# Patient Record
Sex: MALE | Race: BLACK OR AFRICAN AMERICAN | Employment: UNEMPLOYED | URBAN - METROPOLITAN AREA
[De-identification: names, ages, dates, MRNs, and addresses within clinical notes are randomized per-mention and may not be internally consistent; named-entity substitution may affect disease eponyms.]

---
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
## 2021-05-30 ENCOUNTER — HOSPITAL ENCOUNTER (INPATIENT)
Age: 21
LOS: 2 days | Discharge: HOME | DRG: 881 | End: 2021-06-02
Attending: EMERGENCY MEDICINE | Admitting: PSYCHIATRY & NEUROLOGY
Payer: COMMERCIAL

## 2021-05-30 ENCOUNTER — APPOINTMENT (OUTPATIENT)
Dept: GENERAL RADIOLOGY | Age: 21
DRG: 881 | End: 2021-05-30
Attending: EMERGENCY MEDICINE
Payer: COMMERCIAL

## 2021-05-30 DIAGNOSIS — R45.851 SUICIDAL IDEATION: ICD-10-CM

## 2021-05-30 DIAGNOSIS — T50.902A INTENTIONAL DRUG OVERDOSE, INITIAL ENCOUNTER (HCC): Primary | ICD-10-CM

## 2021-05-30 DIAGNOSIS — S61.519A LACERATION OF WRIST, UNSPECIFIED LATERALITY, INITIAL ENCOUNTER: ICD-10-CM

## 2021-05-30 LAB
ALBUMIN SERPL-MCNC: 4.2 G/DL (ref 3.4–5)
ALBUMIN/GLOB SERPL: 1.1 {RATIO} (ref 0.8–1.7)
ALP SERPL-CCNC: 73 U/L (ref 45–117)
ALT SERPL-CCNC: 23 U/L (ref 16–61)
ANION GAP SERPL CALC-SCNC: 5 MMOL/L (ref 3–18)
APAP SERPL-MCNC: <2 UG/ML (ref 10–30)
AST SERPL-CCNC: 25 U/L (ref 10–38)
BASOPHILS # BLD: 0 K/UL (ref 0–0.1)
BASOPHILS NFR BLD: 1 % (ref 0–2)
BILIRUB SERPL-MCNC: 0.8 MG/DL (ref 0.2–1)
BUN SERPL-MCNC: 8 MG/DL (ref 7–18)
BUN/CREAT SERPL: 9 (ref 12–20)
CALCIUM SERPL-MCNC: 9.1 MG/DL (ref 8.5–10.1)
CHLORIDE SERPL-SCNC: 108 MMOL/L (ref 100–111)
CO2 SERPL-SCNC: 25 MMOL/L (ref 21–32)
CREAT SERPL-MCNC: 0.92 MG/DL (ref 0.6–1.3)
DIFFERENTIAL METHOD BLD: ABNORMAL
EOSINOPHIL # BLD: 0.1 K/UL (ref 0–0.4)
EOSINOPHIL NFR BLD: 1 % (ref 0–5)
ERYTHROCYTE [DISTWIDTH] IN BLOOD BY AUTOMATED COUNT: 13.2 % (ref 11.6–14.5)
ETHANOL SERPL-MCNC: <3 MG/DL (ref 0–3)
GLOBULIN SER CALC-MCNC: 3.7 G/DL (ref 2–4)
GLUCOSE SERPL-MCNC: 150 MG/DL (ref 74–99)
HCT VFR BLD AUTO: 43.6 % (ref 36–48)
HGB BLD-MCNC: 14.7 G/DL (ref 13–16)
LIPASE SERPL-CCNC: 37 U/L (ref 73–393)
LYMPHOCYTES # BLD: 0.8 K/UL (ref 0.9–3.6)
LYMPHOCYTES NFR BLD: 17 % (ref 21–52)
MAGNESIUM SERPL-MCNC: 2.4 MG/DL (ref 1.6–2.6)
MCH RBC QN AUTO: 26.5 PG (ref 24–34)
MCHC RBC AUTO-ENTMCNC: 33.7 G/DL (ref 31–37)
MCV RBC AUTO: 78.6 FL (ref 74–97)
MONOCYTES # BLD: 0.5 K/UL (ref 0.05–1.2)
MONOCYTES NFR BLD: 11 % (ref 3–10)
NEUTS SEG # BLD: 3.3 K/UL (ref 1.8–8)
NEUTS SEG NFR BLD: 71 % (ref 40–73)
PLATELET # BLD AUTO: 312 K/UL (ref 135–420)
PMV BLD AUTO: 10 FL (ref 9.2–11.8)
POTASSIUM SERPL-SCNC: 3.9 MMOL/L (ref 3.5–5.5)
PROT SERPL-MCNC: 7.9 G/DL (ref 6.4–8.2)
RBC # BLD AUTO: 5.55 M/UL (ref 4.35–5.65)
SALICYLATES SERPL-MCNC: <1.7 MG/DL (ref 2.8–20)
SODIUM SERPL-SCNC: 138 MMOL/L (ref 136–145)
WBC # BLD AUTO: 4.7 K/UL (ref 4.6–13.2)

## 2021-05-30 PROCEDURE — 83690 ASSAY OF LIPASE: CPT

## 2021-05-30 PROCEDURE — 71045 X-RAY EXAM CHEST 1 VIEW: CPT

## 2021-05-30 PROCEDURE — 80179 DRUG ASSAY SALICYLATE: CPT

## 2021-05-30 PROCEDURE — 83735 ASSAY OF MAGNESIUM: CPT

## 2021-05-30 PROCEDURE — 85025 COMPLETE CBC W/AUTO DIFF WBC: CPT

## 2021-05-30 PROCEDURE — 81003 URINALYSIS AUTO W/O SCOPE: CPT

## 2021-05-30 PROCEDURE — 99285 EMERGENCY DEPT VISIT HI MDM: CPT

## 2021-05-30 PROCEDURE — 80053 COMPREHEN METABOLIC PANEL: CPT

## 2021-05-30 PROCEDURE — 82077 ASSAY SPEC XCP UR&BREATH IA: CPT

## 2021-05-30 PROCEDURE — 80307 DRUG TEST PRSMV CHEM ANLYZR: CPT

## 2021-05-30 PROCEDURE — 81001 URINALYSIS AUTO W/SCOPE: CPT

## 2021-05-30 PROCEDURE — 93005 ELECTROCARDIOGRAM TRACING: CPT

## 2021-05-30 PROCEDURE — 80143 DRUG ASSAY ACETAMINOPHEN: CPT

## 2021-05-31 PROBLEM — F32.A DEPRESSIVE DISORDER: Status: ACTIVE | Noted: 2021-05-31

## 2021-05-31 LAB
AMPHET UR QL SCN: NEGATIVE
APPEARANCE UR: ABNORMAL
ATRIAL RATE: 101 BPM
BACTERIA URNS QL MICRO: ABNORMAL /HPF
BARBITURATES UR QL SCN: NEGATIVE
BENZODIAZ UR QL: NEGATIVE
BILIRUB UR QL: ABNORMAL
CALCULATED P AXIS, ECG09: 77 DEGREES
CALCULATED R AXIS, ECG10: 56 DEGREES
CALCULATED T AXIS, ECG11: 49 DEGREES
CANNABINOIDS UR QL SCN: POSITIVE
COCAINE UR QL SCN: NEGATIVE
COLOR UR: ABNORMAL
COVID-19 RAPID TEST, COVR: NOT DETECTED
DIAGNOSIS, 93000: NORMAL
EPITH CASTS URNS QL MICRO: ABNORMAL /LPF (ref 0–5)
GLUCOSE UR STRIP.AUTO-MCNC: NEGATIVE MG/DL
HDSCOM,HDSCOM: ABNORMAL
HGB UR QL STRIP: NEGATIVE
KETONES UR QL STRIP.AUTO: >160 MG/DL
LEUKOCYTE ESTERASE UR QL STRIP.AUTO: ABNORMAL
METHADONE UR QL: NEGATIVE
NITRITE UR QL STRIP.AUTO: NEGATIVE
OPIATES UR QL: NEGATIVE
P-R INTERVAL, ECG05: 138 MS
PCP UR QL: NEGATIVE
PH UR STRIP: 6 [PH] (ref 5–8)
PROT UR STRIP-MCNC: 100 MG/DL
Q-T INTERVAL, ECG07: 308 MS
QRS DURATION, ECG06: 86 MS
QTC CALCULATION (BEZET), ECG08: 399 MS
RBC #/AREA URNS HPF: NEGATIVE /HPF (ref 0–5)
SOURCE, COVRS: NORMAL
SP GR UR REFRACTOMETRY: >1.03 (ref 1–1.03)
UROBILINOGEN UR QL STRIP.AUTO: 1 EU/DL (ref 0.2–1)
VENTRICULAR RATE, ECG03: 101 BPM
WBC URNS QL MICRO: ABNORMAL /HPF (ref 0–4)

## 2021-05-31 PROCEDURE — 87635 SARS-COV-2 COVID-19 AMP PRB: CPT

## 2021-05-31 PROCEDURE — 90471 IMMUNIZATION ADMIN: CPT

## 2021-05-31 PROCEDURE — 99221 1ST HOSP IP/OBS SF/LOW 40: CPT | Performed by: PSYCHIATRY & NEUROLOGY

## 2021-05-31 PROCEDURE — 90715 TDAP VACCINE 7 YRS/> IM: CPT | Performed by: EMERGENCY MEDICINE

## 2021-05-31 PROCEDURE — 96361 HYDRATE IV INFUSION ADD-ON: CPT

## 2021-05-31 PROCEDURE — 96360 HYDRATION IV INFUSION INIT: CPT

## 2021-05-31 PROCEDURE — 74011250636 HC RX REV CODE- 250/636: Performed by: EMERGENCY MEDICINE

## 2021-05-31 PROCEDURE — 65220000003 HC RM SEMIPRIVATE PSYCH

## 2021-05-31 PROCEDURE — 74011250637 HC RX REV CODE- 250/637: Performed by: PSYCHIATRY & NEUROLOGY

## 2021-05-31 RX ORDER — BUPROPION HYDROCHLORIDE 100 MG/1
100 TABLET ORAL 2 TIMES DAILY
Status: DISCONTINUED | OUTPATIENT
Start: 2021-05-31 | End: 2021-06-02 | Stop reason: HOSPADM

## 2021-05-31 RX ORDER — HYDROXYZINE PAMOATE 50 MG/1
50 CAPSULE ORAL
Status: DISCONTINUED | OUTPATIENT
Start: 2021-05-31 | End: 2021-06-02 | Stop reason: HOSPADM

## 2021-05-31 RX ORDER — TRAZODONE HYDROCHLORIDE 50 MG/1
50 TABLET ORAL
Status: DISCONTINUED | OUTPATIENT
Start: 2021-05-31 | End: 2021-06-02 | Stop reason: HOSPADM

## 2021-05-31 RX ADMIN — BUPROPION HYDROCHLORIDE 100 MG: 100 TABLET, FILM COATED ORAL at 20:45

## 2021-05-31 RX ADMIN — TRAZODONE HYDROCHLORIDE 50 MG: 50 TABLET ORAL at 20:44

## 2021-05-31 RX ADMIN — SODIUM CHLORIDE 1000 ML: 900 INJECTION, SOLUTION INTRAVENOUS at 01:39

## 2021-05-31 RX ADMIN — SODIUM CHLORIDE 1000 ML: 900 INJECTION, SOLUTION INTRAVENOUS at 01:38

## 2021-05-31 RX ADMIN — TETANUS TOXOID, REDUCED DIPHTHERIA TOXOID AND ACELLULAR PERTUSSIS VACCINE, ADSORBED 0.5 ML: 5; 2.5; 8; 8; 2.5 SUSPENSION INTRAMUSCULAR at 03:59

## 2021-05-31 NOTE — H&P
[address] Services    Admission Note      Patient:  Barb Trent Age:  23 y.o. :  2000     SEX:  male MRN:  [id number] [facility]:  [id number]    2021  5:16 PM    Informants and Patient Contacts: Patient/Medical Records      Identifying Data and Chief Compliant: 23year old AA male, with h/o depression,  who was admitted involuntarily for suicidal attempt. He took an overdose of Zyrtec and alcohol. States he passed out and was found by his grandmother. He also caused superficial lacerations on his arms. He states he had a an argument with his girl friend about his new job. He denies any SI today. Denies any access to guns. He states he will work in [employer] in [place] and does not have his driving licence. He feels that he is a burden on his family. He states he lost two of his friend this year and lost two of his pet dogs last year. He lives with his grandparents. His parents are . He has one full brother and several half siblings. He got his GED in 2017. States he is trying to have a child with his girlfriend. History of Present Illness:     Denies previous h/o psych hospitalizations or self harm. He reports  Previous psych follow up at [place] [id number] when his parents were getting . He was on Prozac and Concerta. Past Psychiatric/Medical History:   Past Medical History:   Diagnosis Date    Left hand fracture 12       Substance Use History: Alcohol/Substance abuse /smoking history reviewed. States he smokes marijuana once or twice a week. Denies alcohol use. Allergies: Allergies   Allergen Reactions    Pcn [Penicillins] Rash       Prior to admission medications:   Medications Prior to Admission   Medication Sig    lidocaine (XYLOCAINE) 2 % solution Take 15 mL by mouth as needed for Pain.  (Patient not taking: Reported on 2021)       Current medications:   Current Facility-Administered Medications   Medication Sclerosant Volume (Cc): 6 Dose Route Frequency    hydrOXYzine pamoate (VISTARIL) capsule 50 mg  50 mg Oral Q4H PRN    traZODone (DESYREL) tablet 50 mg  50 mg Oral QHS PRN       Outpatient Physicians:    No stable outpatient psych follow up. Review of Systems  Mood changes    Family History of psychiatric and medical illness and substance abuse  Family History   Problem Relation Age of Onset    Hypertension Mother     Diabetes Father     Diabetes Maternal Grandmother     Hypertension Maternal Grandmother     Elevated Lipids Maternal Grandmother     Diabetes Other     Hypertension Other          Personal History:    Early childhood, developmental, social, occupational and marital history reviewed. He states he will work in [employer] in [place] and does not have his driving licence. He feels that he is a burden on his family. He states he lost two of his friend this year and lost two of his pet dogs last year. He lives with his grandparents. His parents are . He has one full brother and several half siblings. He got his GED in 2017. States he is trying to have a child with his girlfriend.          Mental Status Exam      Appearance    General Behavior   Pleasant and cooperative     Speech form and content,  Language  Associations  Form of Thought   Normal flow and volume  TP : Logical, goal oriented   Mood, Affect  Self-Attitude  Vital Sense  SI/HI/PDW   Depressed  No SI, HI,  Endorses hopelessness   Abnormal Perceptions and illusions   Denies     Delusions   None   Anxiety    Denies   COGNITION Intelligence Abstraction   Intact   Judgement Insight   Limited       Data/Labs/Vital Signs    Patient Vitals for the past 24 hrs:   BP Temp Pulse Resp SpO2 Weight   05/31/21 1100 129/83 97.7 °F (36.5 °C) 65 17     05/31/21 1030 119/86  80 17 97 %    05/31/21 1015 134/67  76 17 98 %    05/31/21 1000 127/76  74 16 98 %    05/31/21 0945 127/82  79 11 100 %    05/31/21 0930 (!) 134/96  89 11 100 %    05/31/21 0915 125/83  79 19 100 %    05/31/21 0900 121/70  69 14 98 %    05/31/21 0845 139/67  67 16 99 %    05/31/21 0830 (!) 151/82  76 16 100 %    05/31/21 0815 130/89 98 °F (36.7 °C) 84 17 100 %    05/31/21 0800 115/80  62 17 100 %    05/31/21 0745 119/73  86 18 100 %    05/31/21 0730 105/64  63 17 100 %    05/31/21 0715 112/64  65 17 100 %    05/31/21 0700 106/70  (!) 56 14 100 %    05/31/21 0600 120/73  (!) 57 15 100 %    05/31/21 0545 117/86  79 17 100 %    05/31/21 0530 116/77  67 16 100 %    05/31/21 0515 120/67  62 16 100 %    05/31/21 0500 115/69  65 15 100 %    05/31/21 0445 115/76  63 16 100 %    05/31/21 0430 122/69  67 17 99 %    05/31/21 0415 118/74  70 17 99 %    05/31/21 0400 126/86  76 13 100 %    05/31/21 0345 123/86  65 15 100 %    05/31/21 0330 122/69  66 18 100 %    05/31/21 0315 122/63  69 16 100 %    05/31/21 0300 116/73  70 15 100 %    05/31/21 0245 121/71  75 15 100 %    05/31/21 0230 119/77  79 18 100 %    05/31/21 0215 130/76  72 18 99 %    05/31/21 0200 131/79  79 17 99 %    05/31/21 0149     100 %    05/31/21 0145 139/76  82 17 99 %    05/31/21 0130 (!) 140/88  92 20 100 %    05/31/21 0115 138/86  87 17 98 %    05/31/21 0100 136/87  95 19 99 %    05/31/21 0045 135/86  88 19 99 %    05/31/21 0030 (!) 146/97  (!) 103 17 100 %    05/31/21 0015 135/86  86 18 99 %    05/31/21 0000 (!) 131/90  82 15 99 %    05/30/21 2345 (!) 142/90  84 18 100 %    05/30/21 2330 (!) 143/81  94 21 100 %    05/30/21 2315 139/78  (!) 101 17 100 %    05/30/21 2314   100 20 100 %    05/30/21 2313   (!) 103 20 100 %    05/30/21 2312   97 19 100 %    05/30/21 2311   (!) 112 23 100 %    05/30/21 2310 (!) 149/93 98.9 °F (37.2 °C) (!) 111 22 100 % 72.6 kg (160 lb)   05/30/21 2309   99 20 100 %    05/30/21 2308   (!) 112 23 100 %    05/30/21 2307   95 18 100 %    05/30/21 2306   (!) 106 19 100 %        Recent Results (from the past 24 hour(s)) Sclerosant Volume (Cc): 0 CBC WITH AUTOMATED DIFF    Collection Time: 05/30/21 11:05 PM   Result Value Ref Range    WBC 4.7 4.6 - 13.2 K/uL    RBC 5.55 4.35 - 5.65 M/uL    HGB 14.7 13.0 - 16.0 g/dL    HCT 43.6 36.0 - 48.0 %    MCV 78.6 74.0 - 97.0 FL    MCH 26.5 24.0 - 34.0 PG    MCHC 33.7 31.0 - 37.0 g/dL    RDW 13.2 11.6 - 14.5 %    PLATELET 746 368 - 997 K/uL    MPV 10.0 9.2 - 11.8 FL    NEUTROPHILS 71 40 - 73 %    LYMPHOCYTES 17 (L) 21 - 52 %    MONOCYTES 11 (H) 3 - 10 %    EOSINOPHILS 1 0 - 5 %    BASOPHILS 1 0 - 2 %    ABS. NEUTROPHILS 3.3 1.8 - 8.0 K/UL    ABS. LYMPHOCYTES 0.8 (L) 0.9 - 3.6 K/UL    ABS. MONOCYTES 0.5 0.05 - 1.2 K/UL    ABS. EOSINOPHILS 0.1 0.0 - 0.4 K/UL    ABS. BASOPHILS 0.0 0.0 - 0.1 K/UL    DF AUTOMATED     METABOLIC PANEL, COMPREHENSIVE    Collection Time: 05/30/21 11:05 PM   Result Value Ref Range    Sodium 138 136 - 145 mmol/L    Potassium 3.9 3.5 - 5.5 mmol/L    Chloride 108 100 - 111 mmol/L    CO2 25 21 - 32 mmol/L    Anion gap 5 3.0 - 18 mmol/L    Glucose 150 (H) 74 - 99 mg/dL    BUN 8 7.0 - 18 MG/DL    Creatinine 0.92 0.6 - 1.3 MG/DL    BUN/Creatinine ratio 9 (L) 12 - 20      GFR est AA >60 >60 ml/min/1.73m2    GFR est non-AA >60 >60 ml/min/1.73m2    Calcium 9.1 8.5 - 10.1 MG/DL    Bilirubin, total 0.8 0.2 - 1.0 MG/DL    ALT (SGPT) 23 16 - 61 U/L    AST (SGOT) 25 10 - 38 U/L    Alk.  phosphatase 73 45 - 117 U/L    Protein, total 7.9 6.4 - 8.2 g/dL    Albumin 4.2 3.4 - 5.0 g/dL    Globulin 3.7 2.0 - 4.0 g/dL    A-G Ratio 1.1 0.8 - 1.7     ETHYL ALCOHOL    Collection Time: 05/30/21 11:05 PM   Result Value Ref Range    ALCOHOL(ETHYL),SERUM <3 0 - 3 MG/DL   LIPASE    Collection Time: 05/30/21 11:05 PM   Result Value Ref Range    Lipase 37 (L) 73 - 393 U/L   MAGNESIUM    Collection Time: 05/30/21 11:05 PM   Result Value Ref Range    Magnesium 2.4 1.6 - 2.6 mg/dL   SALICYLATE    Collection Time: 05/30/21 11:05 PM   Result Value Ref Range    Salicylate level <0.9 (L) 2.8 - 20.0 MG/DL   ACETAMINOPHEN    Collection Detail Level: Zone Sclerosant Volume (Cc): 10 Time: 05/30/21 11:05 PM   Result Value Ref Range    Acetaminophen level <2 (L) 10.0 - 30.0 ug/mL   EKG, 12 LEAD, INITIAL    Collection Time: 05/30/21 11:15 PM   Result Value Ref Range    Ventricular Rate 101 BPM    Atrial Rate 101 BPM    P-R Interval 138 ms    QRS Duration 86 ms    Q-T Interval 308 ms    QTC Calculation (Bezet) 399 ms    Calculated P Axis 77 degrees    Calculated R Axis 56 degrees    Calculated T Axis 49 degrees    Diagnosis       Sinus tachycardia  Possible Left atrial enlargement  Borderline ECG  No previous ECGs available  Confirmed by Brian Martins MD, ----- ([id number]) on 5/31/2021 9:26:38 AM     URINALYSIS W/ RFLX MICROSCOPIC    Collection Time: 05/30/21 11:35 PM   Result Value Ref Range    Color DARK YELLOW      Appearance CLOUDY      Specific gravity >1.030 (H) 1.005 - 1.030    pH (UA) 6.0 5.0 - 8.0      Protein 100 (A) NEG mg/dL    Glucose Negative NEG mg/dL    Ketone >160 (A) NEG mg/dL    Bilirubin MODERATE (A) NEG      Blood Negative NEG      Urobilinogen 1.0 0.2 - 1.0 EU/dL    Nitrites Negative NEG      Leukocyte Esterase SMALL (A) NEG     DRUG SCREEN, URINE    Collection Time: 05/30/21 11:35 PM   Result Value Ref Range    BENZODIAZEPINES Negative NEG      BARBITURATES Negative NEG      THC (TH-CANNABINOL) Positive (A) NEG      OPIATES Negative NEG      PCP(PHENCYCLIDINE) Negative NEG      COCAINE Negative NEG      AMPHETAMINES Negative NEG      METHADONE Negative NEG      HDSCOM (NOTE)    URINE MICROSCOPIC ONLY    Collection Time: 05/30/21 11:35 PM   Result Value Ref Range    WBC 0 to 5 0 - 4 /hpf    RBC Negative 0 - 5 /hpf    Epithelial cells 1+ 0 - 5 /lpf    Bacteria 1+ (A) NEG /hpf   COVID-19 RAPID TEST    Collection Time: 05/31/21  4:00 AM   Result Value Ref Range    Specimen source Nasopharyngeal      COVID-19 rapid test Not detected NOTD         Axis I: Major depressive disorder   Axis II: Deferred  Axis III:   Past Medical History:   Diagnosis Date    Left hand fracture 1/6/13 Axis IV: Poor social supports  Axis V: 30      Recommendations/Plan  · Admit to the inpatient unit for stabilization  · Consider starting SSRI   · Engage in group and milieu activities  · Continue supportive therapy  · Continue to evaluate safety concerns            I certify that this patient's inpatient psychiatric hospital services furnished since the previous certification were, and continue to be, required for treatment that could reasonably be expected to improve the patient's condition, or for diagnostic study, and that the patient continues to need, on a daily basis, active treatment furnished directly by or requiring the supervision of inpatient psychiatric facility personnel. In addition the hospital records show that services furnished were intensive treatment services, admission or related services, or equivalent services.     Rodrigo Serrano MD 5/31/2021 5:16 PM Sclerosant (A) %: 0.30 Post-Care Instructions: Wear prescription compression stockings for up to 1 week.  No exercising for 1 week.

## 2021-05-31 NOTE — BH NOTES
Patient arrived onto unit via wheelchair dressed in paper scrubs and non-skid footwear and accompanied by ED staff and security. Patient cooperative with admission process and has signed paperwork. Patient educated regarding TDO process and \"what to expect. \"  Patient oriented to unit by this nurse, given toiletry bag and shown to room. Patient did not have belongings upon arrival stating \"my brother took them home. \"  Patient encouraged to call family member to bring appropriate clothing and/or snacks. Will continue to monitor and provide interventions as appropriate. Patient admitted under TDO status and admits to this nurse he purposely took \"handful\" of Zyrtec with alcohol in suicide attempt. Patient also admits to self-inflicted (very superficial) lacerations to bilateral wrists. Patient did not require sutures. Patient states stressors include lack of financial resources and arguing with girlfriend \"more than usual.\"  Patient states he was supposed to begin new job this week at [employer] in [place] \"but my girlfriend would have been the one to take me to work so I don't really know what's going to happen now. \"      RN's will initiate, develop, implement, review or revise treatment plan as appropriate for patient.

## 2021-05-31 NOTE — ED PROVIDER NOTES
The patient is a 42-year-old male with no significant past medical history or psychiatric history, who was brought to the ED today with blunt force meant under an ECO after he took multiple tablets of Zyrtec this evening. He grabbed a bottle of his grandmothers Zyrtec that had 90 tablets at 1 point. They are 10 mg tablets. I counted the pills and there are 20 left. He is unsure of how many he took. The prescription was issued over a year ago. The patient states that he just grabbed the bottle and started taking as many as he could. He states that he was drinking alcohol at the same time. After that, he began cutting his wrists. He states that his grandmother found him. He was sleeping at that time. He felt like he was drifting off and potentially dying at that time. He states that he had been drinking alcohol this evening but denies any drug use. He states that he has been financially unsettled recently. He has been going from job to job. He has another job coming up but he is trying to help everyone that he can but feels like he is not helping to \"provide for everyone\" as much as he can. He also states that he and his girlfriend are \"working through some things. \"    He has had no prior suicide attempts or psychiatric hospitalizations. He states that he was just in the moment when he took the medications and cut his wrists.            Past Medical History:   Diagnosis Date    Left hand fracture 1/6/13       Past Surgical History:   Procedure Laterality Date    HX CIRCUMCISION      2014         Family History:   Problem Relation Age of Onset    Hypertension Mother     Diabetes Father     Diabetes Maternal Grandmother     Hypertension Maternal Grandmother     Elevated Lipids Maternal Grandmother     Diabetes Other     Hypertension Other        Social History     Socioeconomic History    Marital status: UNKNOWN     Spouse name: Not on file    Number of children: Not on file    Years of education: Not on file    Highest education level: Not on file   Occupational History    Not on file   Tobacco Use    Smoking status: Former Smoker    Smokeless tobacco: Never Used   Substance and Sexual Activity    Alcohol use: No    Drug use: Yes     Types: Marijuana    Sexual activity: Yes     Partners: Female     Birth control/protection: Condom   Other Topics Concern    Not on file   Social History Narrative    ** Merged History Encounter **          Social Determinants of Health     Financial Resource Strain:     Difficulty of Paying Living Expenses:    Food Insecurity:     Worried About Running Out of Food in the Last Year:     Ran Out of Food in the Last Year:    Transportation Needs:     Lack of Transportation (Medical):  Lack of Transportation (Non-Medical):    Physical Activity:     Days of Exercise per Week:     Minutes of Exercise per Session:    Stress:     Feeling of Stress :    Social Connections:     Frequency of Communication with Friends and Family:     Frequency of Social Gatherings with Friends and Family:     Attends Temple Services:     Active Member of Clubs or Organizations:     Attends Club or Organization Meetings:     Marital Status:    Intimate Partner Violence:     Fear of Current or Ex-Partner:     Emotionally Abused:     Physically Abused:     Sexually Abused: ALLERGIES: Pcn [penicillins]    Review of Systems   All other systems reviewed and are negative. Vitals:    05/30/21 2314 05/30/21 2315 05/30/21 2330 05/30/21 2345   BP:  139/78 (!) 143/81 (!) 142/90   Pulse: 100 (!) 101 94 84   Resp: 20 17 21 18   Temp:       SpO2: 100% 100% 100% 100%   Weight:                Physical Exam  Vitals and nursing note reviewed. Constitutional:       Appearance: He is normal weight. Comments: Somnolent but arousable   HENT:      Head: Normocephalic and atraumatic.       Right Ear: External ear normal.      Left Ear: External ear normal.      Nose: Nose normal.      Mouth/Throat:      Mouth: Mucous membranes are dry. Pharynx: Oropharynx is clear. Comments: Dried white powder on the patient's lips  Eyes:      Extraocular Movements: Extraocular movements intact. Conjunctiva/sclera: Conjunctivae normal.      Pupils: Pupils are equal, round, and reactive to light. Cardiovascular:      Rate and Rhythm: Normal rate and regular rhythm. Pulses: Normal pulses. Heart sounds: Normal heart sounds. Pulmonary:      Effort: Pulmonary effort is normal.      Breath sounds: Normal breath sounds. Abdominal:      General: Abdomen is flat. Bowel sounds are normal.      Palpations: Abdomen is soft. Musculoskeletal:         General: Normal range of motion. Cervical back: Normal range of motion and neck supple. Skin:     General: Skin is warm and dry. Capillary Refill: Capillary refill takes less than 2 seconds. Comments: Multiple superficial abrasions and lacerations forearms. None are deep enough to require sutures. Neurological:      General: No focal deficit present. Mental Status: He is alert and oriented to person, place, and time. Psychiatric:         Attention and Perception: Attention and perception normal.         Mood and Affect: Mood is depressed. Affect is flat. Speech: Speech normal.         Behavior: Behavior is slowed. Behavior is cooperative. Thought Content: Thought content includes suicidal ideation. Thought content does not include homicidal ideation. Thought content includes suicidal plan. Thought content does not include homicidal plan. Cognition and Memory: Cognition and memory normal.         Judgment: Judgment is impulsive and inappropriate.           Recent Results (from the past 12 hour(s))   CBC WITH AUTOMATED DIFF    Collection Time: 05/30/21 11:05 PM   Result Value Ref Range    WBC 4.7 4.6 - 13.2 K/uL    RBC 5.55 4.35 - 5.65 M/uL    HGB 14.7 13.0 - 16.0 g/dL    HCT 43.6 36.0 - 48.0 %    MCV 78.6 74.0 - 97.0 FL    MCH 26.5 24.0 - 34.0 PG    MCHC 33.7 31.0 - 37.0 g/dL    RDW 13.2 11.6 - 14.5 %    PLATELET 110 313 - 936 K/uL    MPV 10.0 9.2 - 11.8 FL    NEUTROPHILS 71 40 - 73 %    LYMPHOCYTES 17 (L) 21 - 52 %    MONOCYTES 11 (H) 3 - 10 %    EOSINOPHILS 1 0 - 5 %    BASOPHILS 1 0 - 2 %    ABS. NEUTROPHILS 3.3 1.8 - 8.0 K/UL    ABS. LYMPHOCYTES 0.8 (L) 0.9 - 3.6 K/UL    ABS. MONOCYTES 0.5 0.05 - 1.2 K/UL    ABS. EOSINOPHILS 0.1 0.0 - 0.4 K/UL    ABS. BASOPHILS 0.0 0.0 - 0.1 K/UL    DF AUTOMATED     METABOLIC PANEL, COMPREHENSIVE    Collection Time: 05/30/21 11:05 PM   Result Value Ref Range    Sodium 138 136 - 145 mmol/L    Potassium 3.9 3.5 - 5.5 mmol/L    Chloride 108 100 - 111 mmol/L    CO2 25 21 - 32 mmol/L    Anion gap 5 3.0 - 18 mmol/L    Glucose 150 (H) 74 - 99 mg/dL    BUN 8 7.0 - 18 MG/DL    Creatinine 0.92 0.6 - 1.3 MG/DL    BUN/Creatinine ratio 9 (L) 12 - 20      GFR est AA >60 >60 ml/min/1.73m2    GFR est non-AA >60 >60 ml/min/1.73m2    Calcium 9.1 8.5 - 10.1 MG/DL    Bilirubin, total 0.8 0.2 - 1.0 MG/DL    ALT (SGPT) 23 16 - 61 U/L    AST (SGOT) 25 10 - 38 U/L    Alk.  phosphatase 73 45 - 117 U/L    Protein, total 7.9 6.4 - 8.2 g/dL    Albumin 4.2 3.4 - 5.0 g/dL    Globulin 3.7 2.0 - 4.0 g/dL    A-G Ratio 1.1 0.8 - 1.7     ETHYL ALCOHOL    Collection Time: 05/30/21 11:05 PM   Result Value Ref Range    ALCOHOL(ETHYL),SERUM <3 0 - 3 MG/DL   LIPASE    Collection Time: 05/30/21 11:05 PM   Result Value Ref Range    Lipase 37 (L) 73 - 393 U/L   MAGNESIUM    Collection Time: 05/30/21 11:05 PM   Result Value Ref Range    Magnesium 2.4 1.6 - 2.6 mg/dL   SALICYLATE    Collection Time: 05/30/21 11:05 PM   Result Value Ref Range    Salicylate level <2.9 (L) 2.8 - 20.0 MG/DL   ACETAMINOPHEN    Collection Time: 05/30/21 11:05 PM   Result Value Ref Range    Acetaminophen level <2 (L) 10.0 - 30.0 ug/mL   EKG, 12 LEAD, INITIAL    Collection Time: 05/30/21 11:15 PM   Result Value Ref Range Ventricular Rate 101 BPM    Atrial Rate 101 BPM    P-R Interval 138 ms    QRS Duration 86 ms    Q-T Interval 308 ms    QTC Calculation (Bezet) 399 ms    Calculated P Axis 77 degrees    Calculated R Axis 56 degrees    Calculated T Axis 49 degrees    Diagnosis       Sinus tachycardia  Possible Left atrial enlargement  Borderline ECG  No previous ECGs available     URINALYSIS W/ RFLX MICROSCOPIC    Collection Time: 05/30/21 11:35 PM   Result Value Ref Range    Color DARK YELLOW      Appearance CLOUDY      Specific gravity >1.030 (H) 1.005 - 1.030    pH (UA) 6.0 5.0 - 8.0      Protein 100 (A) NEG mg/dL    Glucose Negative NEG mg/dL    Ketone >160 (A) NEG mg/dL    Bilirubin MODERATE (A) NEG      Blood Negative NEG      Urobilinogen 1.0 0.2 - 1.0 EU/dL    Nitrites Negative NEG      Leukocyte Esterase SMALL (A) NEG     DRUG SCREEN, URINE    Collection Time: 05/30/21 11:35 PM   Result Value Ref Range    BENZODIAZEPINES Negative NEG      BARBITURATES Negative NEG      THC (TH-CANNABINOL) Positive (A) NEG      OPIATES Negative NEG      PCP(PHENCYCLIDINE) Negative NEG      COCAINE Negative NEG      AMPHETAMINES Negative NEG      METHADONE Negative NEG      HDSCOM (NOTE)    URINE MICROSCOPIC ONLY    Collection Time: 05/30/21 11:35 PM   Result Value Ref Range    WBC 0 to 5 0 - 4 /hpf    RBC Negative 0 - 5 /hpf    Epithelial cells 1+ 0 - 5 /lpf    Bacteria 1+ (A) NEG /hpf     XR CHEST PORT   Final Result      Negative chest.             MDM  Number of Diagnoses or Management Options  Intentional drug overdose, initial encounter ([facility] [address]  Suicidal ideation  Diagnosis management comments: Patient is a 62-year-old male who overdosed on an unknown quantity of 10 mg of Zyrtec and then drank alcohol and subsequently cut his wrists. His wrist lacerations are very superficial and do not require any intervention at this time. They have been cleaned and dressed. The patient has received a tetanus shot today. I spoke with poison control. Poison control stated that the patient will not require a medical admission. The patient could experience sleepiness or drowsiness, could have some anticholinergic symptoms some QTC prolongation some GI symptoms or restlessness and irritability. She states that we needed to observe the patient for 6 to 8 hours in the ED. Also recommended using benzos as needed for agitation. The patient will be at the end of his observation period at 7:00 in the morning. [facility] is involved because the patient is an ETO/TDO. They do not anticipate that he will have a bed at least until later on this morning.              Critical Care  Performed by: Mayra Packer MD  Authorized by: Mayra Packer MD     Critical care provider statement:     Critical care time (minutes):  40    Critical care time was exclusive of:  Separately billable procedures and treating other patients    Critical care was necessary to treat or prevent imminent or life-threatening deterioration of the following conditions:  CNS failure or compromise and toxidrome    Critical care was time spent personally by me on the following activities:  Development of treatment plan with patient or surrogate, discussions with consultants, evaluation of patient's response to treatment, examination of patient, obtaining history from patient or surrogate, ordering and performing treatments and interventions, ordering and review of laboratory studies, ordering and review of radiographic studies, pulse oximetry and re-evaluation of patient's condition    I assumed direction of critical care for this patient from another provider in my specialty: no

## 2021-05-31 NOTE — ED NOTES
ED Course as of Willy 01 1103   Mon May 31, 2021   0815 1 of 8 signed out to me this morning, patient with overdose suicidal ideation.   At this time [facility] has placed the patient to [facility].    [CB]   0851 1 of 8 turnover's at 7 AM.  At this time talked Ghada with psychiatry service, this patient will be admitted here at [facility]    [CB]      ED Course User Index  [CB] Nubia Wheeler MD

## 2021-05-31 NOTE — GROUP NOTE
[facility] GROUP DOCUMENTATION INDIVIDUAL Group Therapy Note Date: 5/31/2021 Group Start Time: 8700 Group End Time: 9044 Group Topic: Nursing SO [facility] 1 ADULT CHEM DEP Patsy Chase, RN 
 
[facility] GROUP DOCUMENTATION GROUP Group Therapy Note: Patient's were each given \"Packet for Success\"   Patients received starter packet of Self-Care activities which included List of Positive Affirmations, coloring cards to write affirmations on, as well as check-lists for mental, physical and emotional self care activities. Attendees: 8 Attendance: Did not attend Additional Notes:  Patient allowed to rest during group due to just arriving onto unit this morning. Casandra Murphy

## 2021-05-31 NOTE — H&P
History and Physical        Patient: Brendan Whitlock               Sex: male          DOA: 5/30/2021         YOB: 2000      Age:  23 y.o.        LOS:  LOS: 0 days        HPI:     Brendan Whitlock is a 23 y.o. male who was admitted experiencing suicidal ideation after attempting to overdose. Active Problems:    Depressive disorder (5/31/2021)        Past Medical History:   Diagnosis Date    Left hand fracture 1/6/13       Past Surgical History:   Procedure Laterality Date    HX CIRCUMCISION      2014       Family History   Problem Relation Age of Onset    Hypertension Mother     Diabetes Father     Diabetes Maternal Grandmother     Hypertension Maternal Grandmother     Elevated Lipids Maternal Grandmother     Diabetes Other     Hypertension Other        Social History     Socioeconomic History    Marital status: UNKNOWN     Spouse name: Not on file    Number of children: Not on file    Years of education: Not on file    Highest education level: Not on file   Tobacco Use    Smoking status: Former Smoker    Smokeless tobacco: Never Used   Substance and Sexual Activity    Alcohol use: No    Drug use: Yes     Types: Marijuana    Sexual activity: Yes     Partners: Female     Birth control/protection: Condom   Social History Narrative    ** Merged History Encounter **          Social Determinants of Health     Financial Resource Strain:     Difficulty of Paying Living Expenses:    Food Insecurity:     Worried About Running Out of Food in the Last Year:     Ran Out of Food in the Last Year:    Transportation Needs:     Lack of Transportation (Medical):      Lack of Transportation (Non-Medical):    Physical Activity:     Days of Exercise per Week:     Minutes of Exercise per Session:    Stress:     Feeling of Stress :    Social Connections:     Frequency of Communication with Friends and Family:     Frequency of Social Gatherings with Friends and Family:     Attends [employer] Services:     Active Member of Clubs or Organizations:     Attends Club or Organization Meetings:     Marital Status:        Prior to Admission medications    Medication Sig Start Date End Date Taking? Authorizing Provider   lidocaine (XYLOCAINE) 2 % solution Take 15 mL by mouth as needed for Pain. 8/29/17   Boubacar Fair MD       Allergies   Allergen Reactions    Pcn [Penicillins] Rash       Review of Systems  A comprehensive review of systems was negative except for that written in the History of Present Illness. Physical Exam:      Visit Vitals  /86   Pulse 80   Temp 98 °F (36.7 °C)   Resp 17   Wt 72.6 kg (160 lb)   SpO2 97%       Physical Exam:  Physical Exam:   General:  Alert, cooperative, no distress, appears stated age. Eyes:  Conjunctivae/corneas clear. PERRL, EOMs intact. Fundi benign   Ears:  Normal TMs and external ear canals both ears. Nose: Nares normal. Septum midline. Mucosa normal. No drainage or sinus tenderness. Mouth/Throat: Lips, mucosa, and tongue normal. Teeth and gums normal.   Neck: Supple, symmetrical, trachea midline, no adenopathy, thyroid: no enlargement/tenderness/nodules, no carotid bruit and no JVD. Back:   Symmetric, no curvature. ROM normal. No CVA tenderness. Lungs:   Clear to auscultation bilaterally. Heart:  Regular rate and rhythm, S1, S2 normal, no murmur, click, rub or gallop. Abdomen:   Soft, non-tender. Bowel sounds normal. No masses,  No organomegaly. Extremities: Extremities normal, atraumatic, no cyanosis or edema. Pulses: 2+ and symmetric all extremities. Skin: Skin color, texture, turgor normal. No rashes or lesions   Lymph nodes: Cervical, supraclavicular, and axillary nodes normal.   Neurologic: CNII-XII intact. Normal strength, sensation and reflexes throughout. Assessment/Plan     Patient was cooperative and pleasant.  Plan is for patient to participate in all unit activities, take medications as prescribed and follow all discharge orders.

## 2021-06-01 PROCEDURE — 65220000003 HC RM SEMIPRIVATE PSYCH

## 2021-06-01 PROCEDURE — 99231 SBSQ HOSP IP/OBS SF/LOW 25: CPT | Performed by: PSYCHIATRY & NEUROLOGY

## 2021-06-01 PROCEDURE — 74011250637 HC RX REV CODE- 250/637: Performed by: PSYCHIATRY & NEUROLOGY

## 2021-06-01 RX ADMIN — BUPROPION HYDROCHLORIDE 100 MG: 100 TABLET, FILM COATED ORAL at 21:15

## 2021-06-01 RX ADMIN — TRAZODONE HYDROCHLORIDE 50 MG: 50 TABLET ORAL at 21:15

## 2021-06-01 RX ADMIN — BUPROPION HYDROCHLORIDE 100 MG: 100 TABLET, FILM COATED ORAL at 11:16

## 2021-06-01 NOTE — PROGRESS NOTES
[address]  Inpatient Progress Note     Date of Service: 06/01/21  Hospital Day: 1     Subjective/Interval History   06/01/21    Treatment Team Notes:  Notes reviewed and/or discussed and report that Berneta Kayser is a 69-year-old male admitted for suicide attempt by overdose on Zyrtec and alcohol. No acute events overnight. Patient has attended groups. Patient interview: Berneta Kayser was interviewed by this writer today. Patient states he is feeling much better. We reviewed events leading to suicide attempt. Patient states he has been drinking alcohol and he was not thinking clearly. He was feeling overwhelmed due to his financial situation. Nevertheless, he says he is feeling hopeful now because he has a job opportunity coming up this week. He has slept very well and he feels that has helped him think clearly. He denies suicidal ideation. He is tolerating Wellbutrin well. Objective     Visit Vitals  /85 (BP 1 Location: Right upper arm, BP Patient Position: Sitting)   Pulse 67   Temp 97.6 °F (36.4 °C)   Resp 20   Wt 72.6 kg (160 lb)   SpO2 97%       No results found for this or any previous visit (from the past 24 hour(s)). Mental Status Examination     Appearance/Hygiene 23 y.o.  BLACK/ male  Hygiene: Good   Behavior/Social Relatedness Appropriate   Musculoskeletal Gait/Station: appropriate  Tone (flaccid, cogwheeling, spastic): not assessed  Psychomotor (hyperkinetic, hypokinetic): calm   Involuntary movements (tics, dyskinesias, akathisa, stereotypies): none   Speech   Rate, rhythm, volume, fluency and articulation are appropriate   Mood   euthymic   Affect    congruent   Thought Process Linear and goal directed   Thought Content and Perceptual Disturbances Denies self-injurious behavior (SIB), suicidal ideation (SI), aggressive behavior or homicidal ideation (HI)    Denies auditory and visual hallucinations   Sensorium and Cognition Grossly intact   Insight  average   Judgment  fair        Assessment/Plan      Psychiatric Diagnoses:   Patient Active Problem List   Diagnosis Code    Depressive disorder F32.9       Level of impairment/disability: Mild    Mercy Marks is a 23 y.o. who is currently admitted for suicidal attempt but currently denies suicidal ideation. 1.  Continue current treatment plan  2. Reviewed instructions, risks, benefits and side effects of medications  3. Disposition/Discharge Date: Patient will go for TDO hearing tomorrow and the court will decide.     Ashley Marie MD DR. [facility]  Psychiatry

## 2021-06-01 NOTE — BH NOTES
Patient spend most of the evening in his room. Patient only came out for dinner and to make phone calls and returned to his room. Patient did not interact with peers while in the milieu. Staff will continue to monitor patient for safety.

## 2021-06-01 NOTE — GROUP NOTE
[facility] GROUP DOCUMENTATION INDIVIDUAL Group Therapy Note Date: 6/1/2021 Group Start Time: 46 Group End Time: 0900 Group Topic: Medication SO [facility] 1 ADULT CHEM DEP Frankie Tatum RN 
 
[facility] GROUP DOCUMENTATION GROUP Group Therapy Note Attendees: 10 Attendance: Attended Patient's Goal:  Medication compliance Interventions/techniques: Supported Follows Directions: Followed directions Interactions: Interacted appropriately Mental Status: Anxious Behavior/appearance: Needed prompting Goals Achieved: Able to self-disclose Amanda Raines RN

## 2021-06-01 NOTE — BSMART NOTE
ART THERAPY GROUP PROGRESS NOTE PATIENT SCHEDULED FOR GROUP AT: 10:00 
 
ATTENDANCE: Full PARTICIPATION LEVEL:  Participates fully in the art process. ATTENTION LEVEL : Able to focus on task. FOCUS: Mindfulness SYMBOLIC & THEMATIC CONTENT AS NOTED IN IMAGERY: Patient was calm and cooperative. He was alert and aware, observed by his use of eye contact and non-verbal responses. His approach was organized and he took ownership of mistakes in the artwork. He was invested in the task at hand and participated in all directives. He shared in group discussion that he'd like to focus on \"courage and staying true to myself,\" to get him through this time.

## 2021-06-01 NOTE — BSMART NOTE
[facility] Biopsychosocial Assessment Current Level of Psychosocial Functioning  
 
[x]Independent 
[]Dependent []Minimal Assist 
 
 
Comments:   
 
Psychosocial High Risk Factors (check all that apply) []Unable to obtain meds []Chronic illness/pain []Substance abuse  
[]Lack of Family Support []Financial stress []Isolation []Inadequate [employer] [x]Suicide attempt(s) []Not taking medications []Victim of crime []Developmental Delay 
[]Unable to manage personal needs []Age 72 or older  
[]  Homeless []Elyssa transportation []Readmission within 30 days []Unemployment []Traumatic Event Psychiatric Advanced Directive: None reported by family Family to involve in treatment: None reported by family Sexual Orientation:  Unknown at this time Patient Strengths: Patient is able to address the need for treatment Patient Barriers: Patient is in need of further treatment Opiate education provided: N/A Safety plan: patient is able to contract for safety. CMHC/MH history: Depressive disorder F32.9 
  
 
Plan of Care: 
medication management, group/individual therapies, family meetings, psycho -education, treatment team meetings to assist with stabilization Initial Discharge Plan:  Determined by Shankar Coulter Clinical Summary: Arleen Willoughby is a 58-year-old male admitted for suicide attempt by overdose on Zyrtec and alcohol. No acute events overnight. Patient has attended groups. Patient denies SI/Hi. Patient denies AVH. Discharge will be determined by court.   
 
Yaz Augustine LCSW=YAMEL

## 2021-06-01 NOTE — PROGRESS NOTES
Problem: Depressed Mood (Adult/Pediatric)  Goal: *STG: Attends activities and groups  Description: Pt attends 2 groups minimum daily  Outcome: Progressing Towards Goal  Goal: *STG: Remains safe in hospital  Description: Pt free from falls and harm daily  Outcome: Progressing Towards Goal  Goal: *STG: Complies with medication therapy  Description: Pt takes medication as prescribed daily  Outcome: Progressing Towards Goal    Merry Duran is meal and med compliant. He has attended some groups today. He is free from falls and harm. Two significant lacerations (prior to admission) on L arm were cleaned and dressed today. Pt states \"I just need to catch up on sleep for a few days\". Will continue to provide support as needed.

## 2021-06-01 NOTE — BSMART NOTE
OCCUPATIONAL THERAPY PROGRESS NOTE Group Time:  0973 Attendance: The patient attended full group. Participation: The patient participated with minimal elaboration in the activity. Attention: The patient needed redirection to activity at least once. Interaction: The patient acknowledges others or responds to questions,  with no spontaneous interaction. Participated as asked. Little elaboration, minimal interaction.

## 2021-06-02 VITALS
SYSTOLIC BLOOD PRESSURE: 142 MMHG | WEIGHT: 160 LBS | TEMPERATURE: 97.1 F | RESPIRATION RATE: 16 BRPM | DIASTOLIC BLOOD PRESSURE: 88 MMHG | OXYGEN SATURATION: 97 % | HEART RATE: 76 BPM

## 2021-06-02 PROCEDURE — 99239 HOSP IP/OBS DSCHRG MGMT >30: CPT | Performed by: PSYCHIATRY & NEUROLOGY

## 2021-06-02 PROCEDURE — 74011250637 HC RX REV CODE- 250/637: Performed by: PSYCHIATRY & NEUROLOGY

## 2021-06-02 RX ORDER — BUPROPION HYDROCHLORIDE 150 MG/1
150 TABLET ORAL
Qty: 30 TABLET | Refills: 0 | Status: SHIPPED | OUTPATIENT
Start: 2021-06-02

## 2021-06-02 RX ADMIN — BUPROPION HYDROCHLORIDE 100 MG: 100 TABLET, FILM COATED ORAL at 08:34

## 2021-06-02 NOTE — GROUP NOTE
ANNA[facility]  GROUP DOCUMENTATION INDIVIDUAL Group Therapy Note Date: 6/1/2021 Group Start Time: 3748 Group End Time: 1815 Group Topic: Nursing SO [facility] 1 ADULT CHEM ODESSA Cook, EDE MANTILLA  GROUP DOCUMENTATION GROUP Group Therapy Note: Importance of physical recreation on mental health Attendees: 9 Attendance: Attended Patient's Goal:  Appropriate physical exercise for individual 
 
Interventions/techniques: Informed and Supported Follows Directions: Followed directions Interactions: Interacted appropriately Mental Status: Calm and Congruent Behavior/appearance: Attentive and Cooperative Goals Achieved: Able to engage in interactions, Discussed coping and Verbalized increased hopefulness Additional Notes:  Patient active participant and appropriate with interactions. Elise Hatfield Courser

## 2021-06-02 NOTE — GROUP NOTE
[facility]  GROUP DOCUMENTATION INDIVIDUAL Group Therapy Note Date: 6/2/2021 Group Start Time: 46 Group End Time: 0900 Group Topic: Medication SO [facility] 1 ADULT CHEM DEP EDE Vargas  GROUP DOCUMENTATION GROUP Group Therapy Note Attendees: 11 Attendance: Attended Patient's Goal:  Medication compliance Interventions/techniques: Informed and Supported Follows Directions: Followed directions Interactions: Interacted appropriately Mental Status: Elevated Behavior/appearance: Cooperative Goals Achieved: Able to self-disclose Suzanne Homans, RN

## 2021-06-02 NOTE — PROGRESS NOTES
[facility] RN Discharge    The discharge information has been reviewed with the patient. The patient verbalized understanding,denies HI/SI, appears stable. Pts belongings were returned, left with [facility] via grandparent to private residence.

## 2021-06-02 NOTE — SUICIDE SAFETY PLAN
SAFETY PLAN    A suicide Safety Plan is a document that supports someone when they are having thoughts of suicide. Warning Signs that indicate a suicidal crisis may be developing: What (situations, thoughts, feelings, body sensations, behaviors, etc.) do you experience that lets you know you are beginning to think about suicide? 1. Trembling  2. Shutting down  3. Chest pain    Internal Coping Strategies:  What things can I do (relaxation techniques, hobbies, physical activities, etc.) to take my mind off my problems without contacting another person? 1. Breathing exercises  2. Eating healthy  3. prayer    People and social settings that provide distraction: Who can I call or where can I go to distract me? 1. Name: João Lazo  Phone: [phone]  2. Name: Bridgett Elder  Phone: [phone]  3. Place: Restaurant           4. Place: [facility] whom I can ask for help: Who can I call when I need help - for example, friends, family, clergy, someone else? 1. Name: Mother              Phone: [phone]  2. Name: João Lazo  Phone: [phone]        3. Suicide Prevention Lifeline: 2-822-848-TALK (3698)    4. [address] [place] [address] Emergency Services -  for example, 174 [facility] suicide hotline, [facility] Hotline:   [facility] [address]  [phone]    Making the environment safe: How can I make my environment (house/apartment/living space) safer? For example, can I remove guns, medications, and other items? 1. Remove sharp objects  2.  No alcohol

## 2021-06-03 ENCOUNTER — TELEPHONE (OUTPATIENT)
Dept: ADDICTION MEDICINE | Age: 21
End: 2021-06-03

## 2021-06-03 NOTE — TELEPHONE ENCOUNTER
This writer completed follow-up call at [phone] to phone number [phone]. Patient states that he is doing great and that he has already called and scheduled an appointment with a PCP for follow-up. Patient thanked me for the follow-up call.

## 2021-06-03 NOTE — DISCHARGE SUMMARY
[facility]  Inpatient Psychiatry   Discharge Summary     Admit date: 5/30/2021    Discharge date and time: 6/2/2021 11:59 AM    Discharge Physician: Naveed Gonzales MD    DISCHARGE DIAGNOSES     Psychiatric Diagnoses:   Patient Active Problem List   Diagnosis Code    Depressive disorder F32.9       Level of impairment/disability:  None    HOSPITAL COURSE     Dawit Delgado is a 23 y.o. BLACK/ male with h/o depression,  who was admitted involuntarily for suicidal attempt. He took an overdose of Zyrtec and alcohol. States he passed out and was found by his grandmother. He also caused superficial lacerations on his arms. He states he had a an argument with his girl friend about his new job. He denies any SI today. Denies any access to guns.      He states he will work in [employer] in [place] and does not have his driving licence. He feels that he is a burden on his family. He states he lost two of his friend this year and lost two of his pet dogs last year. He lives with his grandparents. His parents are . He has one full brother and several half siblings. He got his GED in 2017. States he is trying to have a child with his girlfriend. Denies previous h/o psych hospitalizations or self harm. He reports  Previous psych follow up at [place] [address] when his parents were getting . He was on Prozac and Concerta. HPI above per Dr. Ford Lee who was admitting attending. Hospital course: The patient was admitted and monitored for suicidal ideation. Had a brief and uneventful hospital course. He was admitted under TDO. Admitting attending started Wellbutrin for mood and concentration as patient reported a history of ADHD. The patient tolerated the medication well. His mood was euthymic on the unit. He was not a behavioral problem. He cooperated with his treatment plan. He went to court for TDO hearing and was released by the court.   He plans to follow-up with his PCP.  No SI, HI, psychosis or shahriar at time of discharge. He is future oriented with plans to starting a new job this week at a [employer] in [place]. DISPOSITION/FOLLOW-UP     Disposition: Home    Follow-up Appointments: Follow-up Information     Follow up With Specialties Details Why Contact Info        Pt encouraged to follow up with provider of choice            MEDICATION CHANGES   Outpatient medications:  No current facility-administered medications on file prior to encounter. No current outpatient medications on file prior to encounter. Discharged medication:  Discharge Medication List as of 6/2/2021 11:05 AM      START taking these medications    Details   buPROPion XL (Wellbutrin XL) 150 mg tablet Take 1 Tablet by mouth every morning. Indications: major depressive disorder, Print, Disp-30 Tablet, R-0         STOP taking these medications       lidocaine (XYLOCAINE) 2 % solution Comments:   Reason for Stopping:               Instructions, risks (black box warning), benefits and side effects (EPS, TD, NMS) were discussed in detail prior to discharge. Patient denied any adverse medication side effects prior to discharge. LABS/IMAGING DURING ADMISSION     Results for orders placed or performed during the hospital encounter of 05/30/21   COVID-19 RAPID TEST   Result Value Ref Range    Specimen source Nasopharyngeal      COVID-19 rapid test Not detected NOTD     CBC WITH AUTOMATED DIFF   Result Value Ref Range    WBC 4.7 4.6 - 13.2 K/uL    RBC 5.55 4.35 - 5.65 M/uL    HGB 14.7 13.0 - 16.0 g/dL    HCT 43.6 36.0 - 48.0 %    MCV 78.6 74.0 - 97.0 FL    MCH 26.5 24.0 - 34.0 PG    MCHC 33.7 31.0 - 37.0 g/dL    RDW 13.2 11.6 - 14.5 %    PLATELET 258 853 - 526 K/uL    MPV 10.0 9.2 - 11.8 FL    NEUTROPHILS 71 40 - 73 %    LYMPHOCYTES 17 (L) 21 - 52 %    MONOCYTES 11 (H) 3 - 10 %    EOSINOPHILS 1 0 - 5 %    BASOPHILS 1 0 - 2 %    ABS. NEUTROPHILS 3.3 1.8 - 8.0 K/UL    ABS.  LYMPHOCYTES 0.8 (L) 0.9 - 3.6 K/UL    ABS. MONOCYTES 0.5 0.05 - 1.2 K/UL    ABS. EOSINOPHILS 0.1 0.0 - 0.4 K/UL    ABS. BASOPHILS 0.0 0.0 - 0.1 K/UL    DF AUTOMATED     METABOLIC PANEL, COMPREHENSIVE   Result Value Ref Range    Sodium 138 136 - 145 mmol/L    Potassium 3.9 3.5 - 5.5 mmol/L    Chloride 108 100 - 111 mmol/L    CO2 25 21 - 32 mmol/L    Anion gap 5 3.0 - 18 mmol/L    Glucose 150 (H) 74 - 99 mg/dL    BUN 8 7.0 - 18 MG/DL    Creatinine 0.92 0.6 - 1.3 MG/DL    BUN/Creatinine ratio 9 (L) 12 - 20      GFR est AA >60 >60 ml/min/1.73m2    GFR est non-AA >60 >60 ml/min/1.73m2    Calcium 9.1 8.5 - 10.1 MG/DL    Bilirubin, total 0.8 0.2 - 1.0 MG/DL    ALT (SGPT) 23 16 - 61 U/L    AST (SGOT) 25 10 - 38 U/L    Alk.  phosphatase 73 45 - 117 U/L    Protein, total 7.9 6.4 - 8.2 g/dL    Albumin 4.2 3.4 - 5.0 g/dL    Globulin 3.7 2.0 - 4.0 g/dL    A-G Ratio 1.1 0.8 - 1.7     ETHYL ALCOHOL   Result Value Ref Range    ALCOHOL(ETHYL),SERUM <3 0 - 3 MG/DL   LIPASE   Result Value Ref Range    Lipase 37 (L) 73 - 393 U/L   MAGNESIUM   Result Value Ref Range    Magnesium 2.4 1.6 - 2.6 mg/dL   SALICYLATE   Result Value Ref Range    Salicylate level <2.6 (L) 2.8 - 20.0 MG/DL   URINALYSIS W/ RFLX MICROSCOPIC   Result Value Ref Range    Color DARK YELLOW      Appearance CLOUDY      Specific gravity >1.030 (H) 1.005 - 1.030    pH (UA) 6.0 5.0 - 8.0      Protein 100 (A) NEG mg/dL    Glucose Negative NEG mg/dL    Ketone >160 (A) NEG mg/dL    Bilirubin MODERATE (A) NEG      Blood Negative NEG      Urobilinogen 1.0 0.2 - 1.0 EU/dL    Nitrites Negative NEG      Leukocyte Esterase SMALL (A) NEG     DRUG SCREEN, URINE   Result Value Ref Range    BENZODIAZEPINES Negative NEG      BARBITURATES Negative NEG      THC (TH-CANNABINOL) Positive (A) NEG      OPIATES Negative NEG      PCP(PHENCYCLIDINE) Negative NEG      COCAINE Negative NEG      AMPHETAMINES Negative NEG      METHADONE Negative NEG      HDSCOM (NOTE)    ACETAMINOPHEN   Result Value Ref Range Acetaminophen level <2 (L) 10.0 - 30.0 ug/mL   URINE MICROSCOPIC ONLY   Result Value Ref Range    WBC 0 to 5 0 - 4 /hpf    RBC Negative 0 - 5 /hpf    Epithelial cells 1+ 0 - 5 /lpf    Bacteria 1+ (A) NEG /hpf   EKG, 12 LEAD, INITIAL   Result Value Ref Range    Ventricular Rate 101 BPM    Atrial Rate 101 BPM    P-R Interval 138 ms    QRS Duration 86 ms    Q-T Interval 308 ms    QTC Calculation (Bezet) 399 ms    Calculated P Axis 77 degrees    Calculated R Axis 56 degrees    Calculated T Axis 49 degrees    Diagnosis       Sinus tachycardia  Possible Left atrial enlargement  Borderline ECG  No previous ECGs available  Confirmed by Iftikhar Quick MD, ----- ([id number]) on 5/31/2021 9:26:38 AM          DISCHARGE MENTAL STATUS EVALUATION     Appearance/Hygiene 23 y.o. BLACK/ male  Hygiene: Good   Attitude/Behavior/Social Relatedness Appropriate   Musculoskeletal Gait/Station: appropriate  Tone (flaccid, cogwheeling, spastic): not assessed  Psychomotor (hyperkinetic, hypokinetic): calm  Involuntary movements (tics, dyskinesias, akathisa, stereotypies): none   Speech   Rate, rhythm, volume, fluency and articulation are appropriate   Mood   euthymic   Affect    congruent   Thought Process Linear and goal directed   Thought Content and Perceptual Disturbances Denies self-injurious behavior (SIB), suicidal ideation (SI), aggressive behavior or homicidal ideation (HI)    Denies auditory and visual hallucinations   Sensorium and Cognition  Grossly intact   Insight  fair   Judgment  fair       SUICIDE RISK ASSESSMENT     [] Admission  [x] Discharge   Patient is deemed to be at a low acute risk of suicide. He denies suicidal ideation. He plans to stop drinking alcohol. He feels that the overdose was because he was under the influence of alcohol. He is future oriented. He has supportive family. He was evaluated by the court and released from the hospital.    Completion of discharge was greater than 30 minutes. Over 50% of today's discharge was geared towards counseling and coordination of care.           Chayito Wiggins MD  Psychiatry  [facility]

## 2022-12-30 ENCOUNTER — HOSPITAL ENCOUNTER (OUTPATIENT)
Dept: LAB | Age: 22
Discharge: HOME | End: 2022-12-30
Payer: COMMERCIAL

## 2022-12-30 LAB
ALBUMIN SERPL-MCNC: 4.3 G/DL (ref 3.4–5)
ALBUMIN/GLOB SERPL: 1.2 {RATIO} (ref 0.8–1.7)
ALP SERPL-CCNC: 79 U/L (ref 45–117)
ALT SERPL-CCNC: 27 U/L (ref 16–61)
ANION GAP SERPL CALC-SCNC: 3 MMOL/L (ref 3–18)
AST SERPL-CCNC: 14 U/L (ref 10–38)
BASOPHILS # BLD: 0.1 K/UL (ref 0–0.1)
BASOPHILS NFR BLD: 1 % (ref 0–2)
BILIRUB SERPL-MCNC: 0.8 MG/DL (ref 0.2–1)
BUN SERPL-MCNC: 9 MG/DL (ref 7–18)
BUN/CREAT SERPL: 8 (ref 12–20)
CALCIUM SERPL-MCNC: 10.3 MG/DL (ref 8.5–10.1)
CHLORIDE SERPL-SCNC: 105 MMOL/L (ref 100–111)
CO2 SERPL-SCNC: 28 MMOL/L (ref 21–32)
CREAT SERPL-MCNC: 1.06 MG/DL (ref 0.6–1.3)
DIFFERENTIAL METHOD BLD: ABNORMAL
EOSINOPHIL # BLD: 0.1 K/UL (ref 0–0.4)
EOSINOPHIL NFR BLD: 2 % (ref 0–5)
ERYTHROCYTE [DISTWIDTH] IN BLOOD BY AUTOMATED COUNT: 13.5 % (ref 11.6–14.5)
GLOBULIN SER CALC-MCNC: 3.5 G/DL (ref 2–4)
GLUCOSE SERPL-MCNC: 112 MG/DL (ref 74–99)
HBA1C MFR BLD: 5.1 % (ref 4.2–5.6)
HCT VFR BLD AUTO: 47.2 % (ref 36–48)
HGB BLD-MCNC: 15.8 G/DL (ref 13–16)
IMM GRANULOCYTES # BLD AUTO: 0 K/UL (ref 0–0.04)
IMM GRANULOCYTES NFR BLD AUTO: 0 % (ref 0–0.5)
LYMPHOCYTES # BLD: 1.1 K/UL (ref 0.9–3.6)
LYMPHOCYTES NFR BLD: 16 % (ref 21–52)
MCH RBC QN AUTO: 27.2 PG (ref 24–34)
MCHC RBC AUTO-ENTMCNC: 33.5 G/DL (ref 31–37)
MCV RBC AUTO: 81.4 FL (ref 78–100)
MONOCYTES # BLD: 0.7 K/UL (ref 0.05–1.2)
MONOCYTES NFR BLD: 11 % (ref 3–10)
NEUTS SEG # BLD: 4.5 K/UL (ref 1.8–8)
NEUTS SEG NFR BLD: 70 % (ref 40–73)
NRBC # BLD: 0 K/UL (ref 0–0.01)
NRBC BLD-RTO: 0 PER 100 WBC
PLATELET # BLD AUTO: 364 K/UL (ref 135–420)
PMV BLD AUTO: 11.1 FL (ref 9.2–11.8)
POTASSIUM SERPL-SCNC: 3.9 MMOL/L (ref 3.5–5.5)
PROT SERPL-MCNC: 7.8 G/DL (ref 6.4–8.2)
RBC # BLD AUTO: 5.8 M/UL (ref 4.35–5.65)
SODIUM SERPL-SCNC: 136 MMOL/L (ref 136–145)
WBC # BLD AUTO: 6.4 K/UL (ref 4.6–13.2)

## 2022-12-30 PROCEDURE — 80053 COMPREHEN METABOLIC PANEL: CPT

## 2022-12-30 PROCEDURE — 85025 COMPLETE CBC W/AUTO DIFF WBC: CPT

## 2022-12-30 PROCEDURE — 36415 COLL VENOUS BLD VENIPUNCTURE: CPT

## 2022-12-30 PROCEDURE — 83036 HEMOGLOBIN GLYCOSYLATED A1C: CPT
